# Patient Record
Sex: FEMALE | Race: OTHER | Employment: PART TIME
[De-identification: names, ages, dates, MRNs, and addresses within clinical notes are randomized per-mention and may not be internally consistent; named-entity substitution may affect disease eponyms.]

---

## 2017-03-31 ENCOUNTER — SURGERY (OUTPATIENT)
Age: 57
End: 2017-03-31

## 2017-03-31 ENCOUNTER — HOSPITAL ENCOUNTER (OUTPATIENT)
Age: 57
Setting detail: OUTPATIENT SURGERY
Discharge: HOME | End: 2017-03-31
Attending: INTERNAL MEDICINE | Admitting: INTERNAL MEDICINE
Payer: COMMERCIAL

## 2017-03-31 ENCOUNTER — ANESTHESIA (OUTPATIENT)
Dept: ENDOSCOPY | Age: 57
End: 2017-03-31
Payer: COMMERCIAL

## 2017-03-31 ENCOUNTER — ANESTHESIA EVENT (OUTPATIENT)
Dept: ENDOSCOPY | Age: 57
End: 2017-03-31
Payer: COMMERCIAL

## 2017-03-31 VITALS
SYSTOLIC BLOOD PRESSURE: 117 MMHG | WEIGHT: 165 LBS | HEIGHT: 64 IN | HEART RATE: 60 BPM | RESPIRATION RATE: 16 BRPM | OXYGEN SATURATION: 96 % | DIASTOLIC BLOOD PRESSURE: 81 MMHG | TEMPERATURE: 98.2 F | BODY MASS INDEX: 28.17 KG/M2

## 2017-03-31 PROBLEM — K85.00 IDIOPATHIC PANCREATITIS: Status: ACTIVE | Noted: 2017-03-31

## 2017-03-31 LAB
BUN BLD-MCNC: 8 MG/DL (ref 7–18)
CHLORIDE BLD-SCNC: 104 MMOL/L (ref 100–108)
GLUCOSE BLD STRIP.AUTO-MCNC: 110 MG/DL (ref 74–106)
HCG UR QL: NEGATIVE
HCT VFR BLD CALC: 39 % (ref 36–49)
HGB BLD-MCNC: 13.3 G/DL (ref 12–16)
POTASSIUM BLD-SCNC: 3.5 MMOL/L (ref 3.5–5.5)
SODIUM BLD-SCNC: 142 MMOL/L (ref 136–145)

## 2017-03-31 PROCEDURE — 74011250636 HC RX REV CODE- 250/636

## 2017-03-31 PROCEDURE — 77030019957 HC CUF BLN GASTSCP OCOA -B: Performed by: INTERNAL MEDICINE

## 2017-03-31 PROCEDURE — 76040000007: Performed by: INTERNAL MEDICINE

## 2017-03-31 PROCEDURE — 76060000032 HC ANESTHESIA 0.5 TO 1 HR: Performed by: INTERNAL MEDICINE

## 2017-03-31 PROCEDURE — 82947 ASSAY GLUCOSE BLOOD QUANT: CPT

## 2017-03-31 PROCEDURE — 74011000250 HC RX REV CODE- 250

## 2017-03-31 PROCEDURE — 81025 URINE PREGNANCY TEST: CPT

## 2017-03-31 RX ORDER — DOXAZOSIN 4 MG/1
TABLET ORAL DAILY
COMMUNITY
End: 2019-12-20

## 2017-03-31 RX ORDER — LEVOTHYROXINE SODIUM 88 UG/1
TABLET ORAL
COMMUNITY

## 2017-03-31 RX ORDER — AA/PROT/LYSINE/METHIO/VIT C/B6 50-12.5 MG
TABLET ORAL
COMMUNITY

## 2017-03-31 RX ORDER — IRBESARTAN 75 MG/1
75 TABLET ORAL
COMMUNITY
End: 2019-12-20

## 2017-03-31 RX ORDER — SODIUM CHLORIDE, SODIUM LACTATE, POTASSIUM CHLORIDE, CALCIUM CHLORIDE 600; 310; 30; 20 MG/100ML; MG/100ML; MG/100ML; MG/100ML
INJECTION, SOLUTION INTRAVENOUS
Status: DISCONTINUED | OUTPATIENT
Start: 2017-03-31 | End: 2017-03-31 | Stop reason: HOSPADM

## 2017-03-31 RX ORDER — GLYCOPYRROLATE 0.2 MG/ML
INJECTION INTRAMUSCULAR; INTRAVENOUS AS NEEDED
Status: DISCONTINUED | OUTPATIENT
Start: 2017-03-31 | End: 2017-03-31 | Stop reason: HOSPADM

## 2017-03-31 RX ORDER — PROPOFOL 10 MG/ML
INJECTION, EMULSION INTRAVENOUS AS NEEDED
Status: DISCONTINUED | OUTPATIENT
Start: 2017-03-31 | End: 2017-03-31 | Stop reason: HOSPADM

## 2017-03-31 RX ADMIN — SODIUM CHLORIDE, SODIUM LACTATE, POTASSIUM CHLORIDE, CALCIUM CHLORIDE: 600; 310; 30; 20 INJECTION, SOLUTION INTRAVENOUS at 09:07

## 2017-03-31 RX ADMIN — GLYCOPYRROLATE 0.2 MG: 0.2 INJECTION INTRAMUSCULAR; INTRAVENOUS at 09:10

## 2017-03-31 RX ADMIN — PROPOFOL 50 MG: 10 INJECTION, EMULSION INTRAVENOUS at 09:17

## 2017-03-31 RX ADMIN — PROPOFOL 50 MG: 10 INJECTION, EMULSION INTRAVENOUS at 09:13

## 2017-03-31 RX ADMIN — PROPOFOL 50 MG: 10 INJECTION, EMULSION INTRAVENOUS at 09:11

## 2017-03-31 RX ADMIN — PROPOFOL 50 MG: 10 INJECTION, EMULSION INTRAVENOUS at 09:19

## 2017-03-31 RX ADMIN — PROPOFOL 50 MG: 10 INJECTION, EMULSION INTRAVENOUS at 09:15

## 2017-03-31 NOTE — ANESTHESIA PREPROCEDURE EVALUATION
Anesthetic History   No history of anesthetic complications            Review of Systems / Medical History  Patient summary reviewed and pertinent labs reviewed    Pulmonary            Asthma : well controlled       Neuro/Psych   Within defined limits           Cardiovascular    Hypertension: well controlled              Exercise tolerance: >4 METS     GI/Hepatic/Renal                Endo/Other      Hypothyroidism: well controlled       Other Findings   Comments: Biliary stricture      Risk Factors for Postoperative nausea/vomiting:       History of postoperative nausea/vomiting? NO       Female? YES       Motion sickness? NO       Intended opioid administration for postoperative analgesia?   YES           Physical Exam    Airway  Mallampati: II  TM Distance: 4 - 6 cm  Neck ROM: normal range of motion   Mouth opening: Normal     Cardiovascular  Regular rate and rhythm,  S1 and S2 normal,  no murmur, click, rub, or gallop             Dental    Dentition: Upper partial plate     Pulmonary  Breath sounds clear to auscultation               Abdominal  GI exam deferred       Other Findings            Anesthetic Plan    ASA: 2  Anesthesia type: MAC          Induction: Intravenous  Anesthetic plan and risks discussed with: Patient

## 2017-03-31 NOTE — H&P
Date of Surgery Update:  Lu Hurst was seen and examined. History and physical has been reviewed. The patient has been examined.  There have been no significant clinical changes since the completion of the originally dated History and Physical.    Signed By: Clinton Betancur MD     March 31, 2017 9:04 AM

## 2017-03-31 NOTE — ANESTHESIA POSTPROCEDURE EVALUATION
Post-Anesthesia Evaluation and Assessment    Patient: Desiree Bazan MRN: [id number]  SSN: [id number]    YOB: 1960  Age: 55 y.o. Sex: female      Data from PACU flowsheet    Cardiovascular Function/Vital Signs  Visit Vitals    /67 (BP 1 Location: Left arm, BP Patient Position: At rest)    Pulse 70    Temp 36.8 °C (98.2 °F)    Resp 16    Ht 5' 4\" (1.626 m)    Wt 74.8 kg (165 lb)    SpO2 92%    BMI 28.32 kg/m2       Patient is status post anesthesia    Nausea/Vomiting: controlled    Postoperative hydration reviewed and adequate. Pain:  Managed    Neurological Status: At baseline    Mental Status and Level of Consciousness: Alert and oriented     Pulmonary Status:   Adequate oxygenation and airway patent    Complications related to anesthesia: None    Post-anesthesia assessment completed.  No concerns    Signed By: Antione Hightower CRNA     March 31, 2017

## 2017-03-31 NOTE — DISCHARGE INSTRUCTIONS
For the next 12 hours you should not:   1. drive   2. drink alcohol   3. operate any machinery   4. engage in activities that require mental sharpness or manual dexterity    5. take any drugs other than those prescribed by a physician   6. make any legal or financial decisions    Call your doctor's office immediately, if:   1. Vomiting blood   2. High fever   3. Severe abdominal pain    Take it easy today and resume normal activity tomorrow. Resume previous diet. Hayden Patel MD, FACP          DISCHARGE SUMMARY from Nurse    The following personal items are in your possession at time of discharge:    Dental Appliances: Uppers  Visual Aid: Glasses                            PATIENT INSTRUCTIONS:    After general anesthesia or intravenous sedation, for 24 hours or while taking prescription Narcotics:  · Limit your activities  · Do not drive and operate hazardous machinery  · Do not make important personal or business decisions  · Do  not drink alcoholic beverages  · If you have not urinated within 8 hours after discharge, please contact your surgeon on call. Report the following to your surgeon:  · Excessive pain, swelling, redness or odor of or around the surgical area  · Temperature over 100.5  · Nausea and vomiting lasting longer than 4 hours or if unable to take medications  · Any signs of decreased circulation or nerve impairment to extremity: change in color, persistent  numbness, tingling, coldness or increase pain  · Any questions        What to do at Home:        These are general instructions for a healthy lifestyle:    No smoking/ No tobacco products/ Avoid exposure to second hand smoke    Surgeon General's Warning:  Quitting smoking now greatly reduces serious risk to your health.     Obesity, smoking, and sedentary lifestyle greatly increases your risk for illness    A healthy diet, regular physical exercise & weight monitoring are important for maintaining a healthy lifestyle    You may be retaining fluid if you have a history of heart failure or if you experience any of the following symptoms:  Weight gain of 3 pounds or more overnight or 5 pounds in a week, increased swelling in our hands or feet or shortness of breath while lying flat in bed. Please call your doctor as soon as you notice any of these symptoms; do not wait until your next office visit. Recognize signs and symptoms of STROKE:    F-face looks uneven    A-arms unable to move or move unevenly    S-speech slurred or non-existent    T-time-call 911 as soon as signs and symptoms begin-DO NOT go       Back to bed or wait to see if you get better-TIME IS BRAIN. Warning Signs of HEART ATTACK     Call 911 if you have these symptoms:   Chest discomfort. Most heart attacks involve discomfort in the center of the chest that lasts more than a few minutes, or that goes away and comes back. It can feel like uncomfortable pressure, squeezing, fullness, or pain.  Discomfort in other areas of the upper body. Symptoms can include pain or discomfort in one or both arms, the back, neck, jaw, or stomach.  Shortness of breath with or without chest discomfort.  Other signs may include breaking out in a cold sweat, nausea, or lightheadedness. Don't wait more than five minutes to call 911 - MINUTES MATTER! Fast action can save your life. Calling 911 is almost always the fastest way to get lifesaving treatment. Emergency Medical Services staff can begin treatment when they arrive -- up to an hour sooner than if someone gets to the hospital by car. The discharge information has been reviewed with the patient. The patient verbalized understanding. Discharge medications reviewed with the patient and appropriate educational materials and side effects teaching were provided. Patient armband removed and given to patient to take home.   Patient was informed of the privacy risks if armband lost or stolen

## 2017-03-31 NOTE — IP AVS SNAPSHOT
[address] [facility] 
 
 
 [address]  
[phone] Patient: Rowena Vega MRN: [id number] ESZ:[id number] You are allergic to the following Allergen Reactions Aspirin Other (comments) Intolerance to uncoated ASA Lisinopril Other (comments) Cough Norvasc (Amlodipine) Other (comments) Swelling on legs, red spots. Phenergan (Promethazine) Other (comments) Chills, fever, vomiting, hot flush. Recent Documentation Height Weight BMI Smoking Status 1.626 m 74.8 kg 28.32 kg/m2 Former Smoker Emergency Contacts Name Discharge Info Relation Home Work Mobile [address] [facility] CAREGIVER [3] Spouse [3] [phone] About your hospitalization You were admitted on:  March 31, 2017 [facility] You were discharged on:  March 31, 2017 Unit phone number:  [phone] Why you were hospitalized Your primary diagnosis was:  Idiopathic Pancreatitis Providers Seen During Your Hospitalizations Provider Role Specialty Primary office phone Mert Luke MD Attending Provider Gastroenterology [phone] Your Primary Care Physician (PCP) Primary Care Physician Office Phone Office Fax Rowena Villaseñor [phone] Follow-up Information Follow up With Details Comments Contact Info Ibis Elise MD Schedule an appointment as soon as possible for a visit  [address] Kelsi Shah [address]  [address] [place] [address] [address] [address] 
[phone] Mert Luke MD Schedule an appointment as soon as possible for a visit in 3 months  [place] [address] [address] [facility] [address] [address] [phone] Current Discharge Medication List  
  
CONTINUE these medications which have NOT CHANGED Dose & Instructions Dispensing Information Comments Morning Noon Evening Bedtime AVAPRO 75 mg tablet Generic drug:  irbesartan Your last dose was: Your next dose is:    
   
   
 Dose:  75 mg Take 75 mg by mouth nightly. Refills:  0  
     
   
   
   
  
 CALCIUM 600 + D 600-125 mg-unit Tab Generic drug:  calcium-cholecalciferol (d3) Your last dose was: Your next dose is: Take  by mouth. Refills:  0  
     
   
   
   
  
 CO Q-10 10 mg Cap Generic drug:  coenzyme q10 Your last dose was: Your next dose is: Take  by mouth. Refills:  0  
     
   
   
   
  
 doxazosin 4 mg tablet Commonly known as:  CARDURA Your last dose was: Your next dose is: Take  by mouth daily. Refills:  0  
     
   
   
   
  
 SYNTHROID 88 mcg tablet Generic drug:  levothyroxine Your last dose was: Your next dose is: Take  by mouth Daily (before breakfast). Refills:  0 Discharge Instructions For the next 12 hours you should not: 1. drive 2. drink alcohol 3. operate any machinery 4. engage in activities that require mental sharpness or manual dexterity 5. take any drugs other than those prescribed by a physician 6. make any legal or financial decisions Call your doctor's office immediately, if: 1. Vomiting blood 2. High fever 3. Severe abdominal pain Take it easy today and resume normal activity tomorrow. Resume previous diet. Hayden Parrish MD, [place] DISCHARGE SUMMARY from Nurse The following personal items are in your possession at time of discharge: 
 
Dental Appliances: Uppers Visual Aid: Glasses PATIENT INSTRUCTIONS: 
 
After general anesthesia or intravenous sedation, for 24 hours or while taking prescription Narcotics: · Limit your activities · Do not drive and operate hazardous machinery · Do not make important personal or business decisions · Do  not drink alcoholic beverages · If you have not urinated within 8 hours after discharge, please contact your surgeon on call. Report the following to your surgeon: 
· Excessive pain, swelling, redness or odor of or around the surgical area · Temperature over 100.5 · Nausea and vomiting lasting longer than 4 hours or if unable to take medications · Any signs of decreased circulation or nerve impairment to extremity: change in color, persistent  numbness, tingling, coldness or increase pain · Any questions What to do at Home: These are general instructions for a healthy lifestyle: No smoking/ No tobacco products/ Avoid exposure to second hand smoke Surgeon General's Warning:  Quitting smoking now greatly reduces serious risk to your health. Obesity, smoking, and sedentary lifestyle greatly increases your risk for illness A healthy diet, regular physical exercise & weight monitoring are important for maintaining a healthy lifestyle You may be retaining fluid if you have a history of heart failure or if you experience any of the following symptoms:  Weight gain of 3 pounds or more overnight or 5 pounds in a week, increased swelling in our hands or feet or shortness of breath while lying flat in bed. Please call your doctor as soon as you notice any of these symptoms; do not wait until your next office visit. Recognize signs and symptoms of STROKE: 
 
F-face looks uneven A-arms unable to move or move unevenly S-speech slurred or non-existent T-time-call 911 as soon as signs and symptoms begin-DO NOT go Back to bed or wait to see if you get better-TIME IS BRAIN. Warning Signs of HEART ATTACK Call 911 if you have these symptoms: 
? Chest discomfort.  Most heart attacks involve discomfort in the center of the chest that lasts more than a few minutes, or that goes away and comes back. It can feel like uncomfortable pressure, squeezing, fullness, or pain. ? Discomfort in other areas of the upper body. Symptoms can include pain or discomfort in one or both arms, the back, neck, jaw, or stomach. ? Shortness of breath with or without chest discomfort. ? Other signs may include breaking out in a cold sweat, nausea, or lightheadedness. Don't wait more than five minutes to call [address]! Fast action can save your life. Calling 911 is almost always the fastest way to get lifesaving treatment. Emergency Medical Services staff can begin treatment when they arrive  up to an hour sooner than if someone gets to the hospital by car. The discharge information has been reviewed with the patient. The patient verbalized understanding. Discharge medications reviewed with the patient and appropriate educational materials and side effects teaching were provided. Patient armband removed and given to patient to take home. Patient was informed of the privacy risks if armband lost or stolen Discharge Orders None Introducing [facility]! Megan Johnston introduces [employer] patient portal. Now you can access parts of your medical record, email your doctor's office, and request medication refills online. 1. In your internet browser, go to [email or web address] 2. Click on the First Time User? Click Here link in the Sign In box. You will see the New Member Sign Up page. 3. Enter your [employer] Access Code exactly as it appears below. You will not need to use this code after youve completed the sign-up process. If you do not sign up before the expiration date, you must request a new code. · [employer] Access Code: [email or web address] Expires: 6/27/2017 11:39 AM 
 
4.  Enter the last four digits of your Social Security Number (xxxx) and Date of Birth (mm/dd/yyyy) as indicated and click Submit. You will be taken to the next sign-up page. 5. Create a [employer] ID. This will be your [employer] login ID and cannot be changed, so think of one that is secure and easy to remember. 6. Create a [employer] password. You can change your password at any time. 7. Enter your Password Reset Question and Answer. This can be used at a later time if you forget your password. 8. Enter your e-mail address. You will receive e-mail notification when new information is available in [address] 9. Click Sign Up. You can now view and download portions of your medical record. 10. Click the Download Summary menu link to download a portable copy of your medical information. If you have questions, please visit the Frequently Asked Questions section of the [employer] website. Remember, [employer] is NOT to be used for urgent needs. For medical emergencies, dial 911. Now available from your iPhone and Android! General Information Please provide this summary of care documentation to your next provider. Patient Signature:  ____________________________________________________________ Date:  ____________________________________________________________  
  
Sacha Robb Provider Signature:  ____________________________________________________________ Date:  ____________________________________________________________

## 2017-03-31 NOTE — PROCEDURES
EGD and Endoscopic Ultrasound Procedure Note    Patient: Chadwick Perea MRN: [id number]  SSN: [id number]    YOB: 1960  Age: 55 y.o. Sex: female      Date/Time:  3/31/2017 9:41 AM      Procedure: EGD and Endoscopic ultrasound of the pancreas and biliary system. Indication:   Chronic abdominal pain, vomiting and elevated serum lipase. :  Analisa Snell MD  Referring Provider:   Alysia Sousa MD  History: The history and physical exam were reviewed and updated. Endoscope: Olympus GIF-H190 Gastroscope and UE-160 Radial Echoendoscope  Extent of Exam: third portion of the duodenum  ASA: ASA 2 - Patient with mild systemic disease with no functional limitations  Anethesia/Sedation:  MAC anesthesia Propofol    Description of the procedure: The procedure was discussed with the patient including risks, benefits, alternatives including risks of pancreatitis, bleeding, perforation and aspiration. A safety timeout was performed. The patient was placed in the left lateral decubitus position. A bite block was placed. Monitored anesthesia was provided by the CRNA. The patients vital signs were monitored at all times including heart rate/rhythm, blood pressure and oxygen saturation. The GIF-H190 endoscope was then passed under direct visualization to the third portion of the duodenum. The esophagus, stomach and duodenum were evaluated during slow scope withdrawal.  Retroflexion was performed in the stomach. Upon completion of the EGD, the scope was removed and a radial scanning echoendoscope was inserted. The echoendoscope was advanced into the esophagus, stomach and 3rd portion of the duodenum. The pancreas head and surrounding areas were imaged with ultrasound. The endoscope was then withdrawn into the stomach. The pancreas body and tail and surrounding areas were again visualized with ultrasound. The endoscope was withdrawn into the esophagus.  The mediastinum and esophagus were imaged with ultrasound. The endoscope was then withdrawn and patient transferred to recovery in stable condition. Findings:   Esophagus: Z-line was located at 38 cm. Normal mucosa. No varices, mass or ulcers. No hiatal hernia. Stomach: Normal.  No ulcer, mass or varices. No retained food. Patent pylorus. Duodenum: Normal bulb, 2nd and 3rd portions. Normal major papilla. EUS: Normal pancreatic echotexture. Main pancreatic duct measured 2.2 mm at the body. No dilated pancreatic duct side-branches. No calcifications, lobulation or hyperechoid strands in the pancreatic parenchyma. No peripancreatic or portal adenopathy. CBD was not dilated and measured 3.4 mm. Gallbladder was contracted. Normal portal vein and its confluence. Normal celiac axis,  Inferior mediastinum and subcarinal regions. Left lobe of liver was normal.  Left adrenal gland was not easily visualized. IMPRESSION:   1. Normal EGD. 2.   Normal EUS of pancreas. No evidence of acute or chronic pancreatitis. 3.   Suspect chronic functional dyspepsia with clinically insignificant chronic mild hyperlipasemia. RECOMMENDATIONS:  1. Resume diet today. 2.  Continue Pepcid and Zofran daily or prn.  3.  Begin a trial of Amitriptylline 10 mg PO qhs.  4.  Schedule a nuclear med gastric empyting scan to r/o functional gastroparesis. 5.  RTO in 3 months. Specimens: None           Complications:   None; patient tolerated the procedure well. EBL:Minimal      Hayden Cox MD, FACP  March 31, 2017  9:41 AM

## 2019-11-21 PROBLEM — E87.6 HYPOKALEMIA: Status: ACTIVE | Noted: 2019-11-21

## 2019-11-21 PROBLEM — R11.2 NAUSEA AND VOMITING: Status: ACTIVE | Noted: 2019-11-21

## 2019-11-21 PROBLEM — R07.9 CHEST PAIN: Status: ACTIVE | Noted: 2019-11-21

## 2022-07-11 ENCOUNTER — DOCUMENTATION ONLY (OUTPATIENT)
Dept: NEUROSURGERY | Age: 62
End: 2022-07-11

## 2022-07-11 NOTE — PROGRESS NOTES
Office notes and referral received from Dr. Palmira Cardenas at the [address] [place] [address] in [place]. These notes were scanned into the medical record after review. 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 Her tinnitus is described by Dr. Lopez Ros is nonpulsatile buzzing but I agreed to see her per the patient's insistence.

## 2022-07-13 ENCOUNTER — DOCUMENTATION ONLY (OUTPATIENT)
Dept: NEUROSURGERY | Age: 62
End: 2022-07-13

## 2022-07-15 ENCOUNTER — DOCUMENTATION ONLY (OUTPATIENT)
Dept: NEUROSURGERY | Age: 62
End: 2022-07-15

## 2022-07-15 ENCOUNTER — OFFICE VISIT (OUTPATIENT)
Dept: NEUROSURGERY | Age: 62
End: 2022-07-15
Payer: COMMERCIAL

## 2022-07-15 VITALS
HEART RATE: 61 BPM | WEIGHT: 145.3 LBS | SYSTOLIC BLOOD PRESSURE: 118 MMHG | HEIGHT: 64 IN | TEMPERATURE: 96.9 F | OXYGEN SATURATION: 98 % | BODY MASS INDEX: 24.81 KG/M2 | DIASTOLIC BLOOD PRESSURE: 70 MMHG

## 2022-07-15 DIAGNOSIS — H93.13 TINNITUS, SUBJECTIVE, BILATERAL: Primary | ICD-10-CM

## 2022-07-15 PROCEDURE — 99203 OFFICE O/P NEW LOW 30 MIN: CPT | Performed by: RADIOLOGY

## 2022-07-15 RX ORDER — LATANOPROST 50 UG/ML
SOLUTION/ DROPS OPHTHALMIC
COMMUNITY
Start: 2022-07-14

## 2022-07-15 RX ORDER — ACETAMINOPHEN 500 MG
500 TABLET ORAL
COMMUNITY

## 2022-07-15 RX ORDER — AZILSARTAN KAMEDOXOMIL 40 MG/1
40 TABLET ORAL
COMMUNITY

## 2022-07-15 RX ORDER — CHOLECALCIFEROL (VITAMIN D3) 125 MCG
2000 CAPSULE ORAL
COMMUNITY

## 2022-07-15 NOTE — PATIENT INSTRUCTIONS
Your tinnitus is of a nonpulsatile origin. Dr. Massiel Dobbins specialty is pulsatile tinnitus and vascular malformations of the head and neck. Fortunately, after examination, Dr. Massiel Dobbins does not believe that you have a dangerous cause of tinnitus. 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 Your [facility]A images from [facility] were reviewed. You are being referred to Dr. Della Mcdonnell. Dr. Chapincito Proctor is a skull base ear nose and throat surgeon who specializes in nonpulsatile tinnitus. He is obtain all of your head/brain, ear and neck imaging from anterior normal flow before the visit to Dr. Bri Floyd. Our office will attempt to obtain copies of the imaging digitally but that may not be possible. No further neuro interventional surgery follow-up is indicated.

## 2022-07-15 NOTE — PROGRESS NOTES
Neurointerventional Surgery New Outpatient Visit    Patient: Symone Watson MRN: [id number]  SSN: [id number]    YOB: 1960  Age: 65 y.o. Sex: female      Subjective:      Symone Watson is a 65 y.o. female referred by Dr. Palmira Cardenas with [address] for nonpulsatile tinnitus. This patient has a history of fibromuscular dysplasia placated by renal artery stenosis and hypertension that was successfully angioplastied in [place]. I am consulted for longstanding tinnitus. The patient reports a tinnitus handicap inventory of 16 today. She reports a \"constant, buzzing, high-pitched noise\" that is worse on the right side than the left side that she believes began 2 years ago during a 3-week course of Plaquenil prescribed for her rheumatoid arthritis. This is progressively worsened in the sense of increased volume over the past year. She is seen a prior ENT (Dr. Ellen Medina) who performed \"some testing\". No etiology was discovered per patient report. There is notes are not available for direct review. In addition to the buzzing high-pitched noise, the patient also reports intermittent popping, mostly in the left ear when she \"hears snoring\" or \"wears one earbud\". This is associated with a sense of pressure. She also reports intermittent dizziness that is not positional lasting up to 5 to 10 seconds several times per week. She reports stiffness in the right posterior lateral neck, dry throat and occasional difficulty swallowing large pills such as calcium. She acknowledges that neck pain may be due to her arthritis. In addition to CTA imaging performed at [facility], she believes she has had MRIs of the brain and possibly inner ear at [facility] the past.  UVA images are available for review today. Other outside images or reports   are provided.     Past Medical History:   Diagnosis Date    Asthma     mild     Chronic kidney disease fibromuscular dysplagia right renal artery     Fibromuscular dysplasia (HCC)     Hyperparathyroidism ([address]     Hypertension     Pancreatic tumor     Thyroid disease     hypo     Past Surgical History:   Procedure Laterality Date    HX PARTIAL THYROIDECTOMY      MO ENDOSCOPIC ULTRASOUND EXAM  3/31/2017           No family history on file. Social History     Tobacco Use    Smoking status: Former Smoker     Quit date: 2006     Years since quittin.5    Smokeless tobacco: Never Used   Substance Use Topics    Alcohol use: Yes     Comment: occa      Current Outpatient Medications   Medication Sig Dispense Refill    chlorthalidone (HYGROTEN) 25 mg tablet Take 25 mg by mouth daily.  potassium chloride SR (KLOR-CON 10) 10 mEq tablet Take 40 mEq by mouth.  irbesartan (AVAPRO) 300 mg tablet Take 300 mg by mouth nightly.  Cetirizine (ZYRTEC) 10 mg cap Take  by mouth.  traMADol (ULTRAM) 50 mg tablet Take 50 mg by mouth every six (6) hours as needed for Pain.  ALPRAZolam (XANAX) 0.25 mg tablet Take  by mouth nightly as needed for Anxiety.  rosuvastatin (CRESTOR) 5 mg tablet Take 5 mg by mouth nightly.  ondansetron hcl (ZOFRAN) 4 mg tablet Take 4 mg by mouth every eight (8) hours as needed for Nausea.  aspirin delayed-release 81 mg tablet Take  by mouth daily.  levothyroxine (SYNTHROID) 88 mcg tablet Take  by mouth Daily (before breakfast).  calcium-cholecalciferol, d3, (CALCIUM 600 + D) 600-125 mg-unit tab Take  by mouth.  coenzyme q10 (CO Q-10) 10 mg cap Take  by mouth. Allergies   Allergen Reactions    Aspirin Other (comments)     Intolerance to uncoated ASA    Lisinopril Other (comments)     Cough     Norvasc [Amlodipine] Other (comments)     Swelling on legs, red spots.  Phenergan [Promethazine] Other (comments)     Chills, fever, vomiting, hot flush.         Review of Systems:  A comprehensive review of systems was negative except for that written in the History of Present Illness. Objective:     Vitals:    07/15/22 1418   BP: 118/70   Pulse: 61   Temp: 96.9 °F (36.1 °C)   SpO2: 98%   Weight: 145 lb 4.8 oz (65.9 kg)   Height: 5' 4\" (1.626 m)        Physical Exam:  GENERAL: alert, cooperative, no distress, appears stated age  THROAT & NECK: normal and no erythema or exudates noted. Vascular: No cervical bruit or objective pulsatile tinnitus. Jugular compression does not augment or diminish the nonpulsatile tinnitus symptoms. LUNG: clear to auscultation bilaterally  HEART: regular rate and rhythm, S1, S2 normal, no murmur, click, rub or gallop  ABDOMEN: soft, non-tender. Bowel sounds normal. No masses,  no organomegaly  EXTREMITIES:  extremities normal, atraumatic, no cyanosis or edema  PSYCHIATRIC: non focal    Neurologic Exam:  Mental Status:  Alert and oriented x 4. Appropriate affect, mood and behavior. Language:    Normal fluency, repetition, comprehension and naming. Cranial Nerves:   Pupils equal, round and reactive to light. Visual fields full to confrontation. Extraocular movements intact. Facial sensation intact V1 - V3. Full facial strength, no asymmetry. Hearing intact bilaterally. No dysarthria. Tongue protrudes to midline, palate elevates symmetrically. Shoulder shrug 5/5 bilaterally. Motor:    No pronator drift. Bulk and tone normal.      5/5 power in all extremities proximally and distally. No involuntary movements. Sensation:    Sensation intact throughout to light touch without extinction or neglect. Coordination & Gait: Normal.      Imaging:    Reviewed a CTA of the head and neck obtained at [facility] on 5/31/2019. The only head and neck imaging available for review. This demonstrates minimal bilateral distal internal carotid artery fibromuscular dysplasia with no pseudoaneurysm, stenosis or intimal flap.   The cervical and intracranial arteries are otherwise unremarkable. At the level of the cerebellar pontine angle, there is no obvious mass but the study is limited by modality for evaluation of the cochlear nerve. Slice thickness and spatial resolution is inadequate for evaluation of the inner ear structures including the semicircular canals. Assessment:     2-year history of nonpulsatile buzzing, high frequency continuous bilateral pulsatile tinnitus, right greater than left. I am confident that this is not resulting from a dangerous vascular etiology based on my clinical examination and review of a CTA from [facility]. The patient understands that nonpulsatile tinnitus is not my clinical specialty and that referral to a different expert is necessary for more definitive clinical characterization. Plan:     Referral to Dr. No Rangel, ENT surgeon an expert in nonpulsatile tinnitus and ear surgery. We will attempt to digitally obtain the imaging from [facility].  If we cannot, the patient will obtain CDs of those images for her visit to Dr. Earnest Rey. The patient stated that she was \"afraid of dying\". I reassured her that the symptoms are unlikely to be dangerous or life-threatening. Thank you for this consult and participating in the care of this patient. I have discussed the diagnosis with the patient and the intended plan as seen in the above orders. Patient is in agreement.       Signed By: Blayne Waters MD     July 15, 2022

## 2022-07-15 NOTE — PROGRESS NOTES
New patient referred by Dr Sol Hernandez presenting with Pulsatile tinnitus bilaterally. Patient reports constant buzzing and high pitched sound in both ears. The right greater than the left. Reports stiffness on right side of neck, episodes of dizziness, dry mouth,throat,and eyes, and occasional difficulty swallowing. Symptoms started 2022 after starting Plaquenil. Patient stopped taking medication after 3 weeks due to side effects. She did not feel like herself. No acute problems reported.

## (undated) DEVICE — KENDALL 500 SERIES DIAPHORETIC FOAM MONITORING ELECTRODE - TEAR DROP SHAPE ( 30/PK): Brand: KENDALL

## (undated) DEVICE — BASIN EMESIS 500CC ROSE 250/CS 60/PLT: Brand: MEDEGEN MEDICAL PRODUCTS, LLC

## (undated) DEVICE — Device: Brand: BALLOON3

## (undated) DEVICE — FLEX ADVANTAGE 1500CC: Brand: FLEX ADVANTAGE

## (undated) DEVICE — MEDI-VAC NON-CONDUCTIVE SUCTION TUBING: Brand: CARDINAL HEALTH

## (undated) DEVICE — BITE BLK ENDOSCP AD 54FR GRN POLYETH ENDOSCP W STRP SLD

## (undated) DEVICE — SYR 50ML SLIP TIP NSAF LF STRL --

## (undated) DEVICE — KIT COLON W/ 1.1OZ LUB AND 2 END